# Patient Record
Sex: FEMALE | Race: OTHER
[De-identification: names, ages, dates, MRNs, and addresses within clinical notes are randomized per-mention and may not be internally consistent; named-entity substitution may affect disease eponyms.]

---

## 2018-02-19 ENCOUNTER — HOSPITAL ENCOUNTER (INPATIENT)
Dept: HOSPITAL 11 - JP.SDS | Age: 53
LOS: 3 days | Discharge: HOME | DRG: 336 | End: 2018-02-22
Attending: SURGERY | Admitting: SURGERY
Payer: MEDICARE

## 2018-02-19 DIAGNOSIS — I45.6: ICD-10-CM

## 2018-02-19 DIAGNOSIS — R10.9: ICD-10-CM

## 2018-02-19 DIAGNOSIS — K56.51: Primary | ICD-10-CM

## 2018-02-19 DIAGNOSIS — J45.909: ICD-10-CM

## 2018-02-19 DIAGNOSIS — Z98.84: ICD-10-CM

## 2018-02-19 DIAGNOSIS — Z53.31: ICD-10-CM
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
## 2018-02-19 DIAGNOSIS — E03.9: ICD-10-CM

## 2018-02-19 DIAGNOSIS — K56.1: ICD-10-CM

## 2018-02-19 DIAGNOSIS — Z98.0: ICD-10-CM

## 2018-02-19 DIAGNOSIS — T36.1X5A: ICD-10-CM

## 2018-02-19 DIAGNOSIS — K91.2: ICD-10-CM

## 2018-02-19 DIAGNOSIS — R22.9: ICD-10-CM

## 2018-02-19 DIAGNOSIS — E55.9: ICD-10-CM

## 2018-02-19 DIAGNOSIS — E53.8: ICD-10-CM

## 2018-02-19 PROCEDURE — C9113 INJ PANTOPRAZOLE SODIUM, VIA: HCPCS

## 2018-02-20 PROCEDURE — 3E0T3BZ INTRODUCTION OF ANESTHETIC AGENT INTO PERIPHERAL NERVES AND PLEXI, PERCUTANEOUS APPROACH: ICD-10-PCS | Performed by: SURGERY

## 2018-02-20 PROCEDURE — 0DBL0ZX EXCISION OF TRANSVERSE COLON, OPEN APPROACH, DIAGNOSTIC: ICD-10-PCS | Performed by: SURGERY

## 2018-02-20 PROCEDURE — 0DBA0ZX EXCISION OF JEJUNUM, OPEN APPROACH, DIAGNOSTIC: ICD-10-PCS | Performed by: SURGERY

## 2018-02-20 PROCEDURE — 0DNW0ZZ RELEASE PERITONEUM, OPEN APPROACH: ICD-10-PCS | Performed by: SURGERY

## 2018-02-20 PROCEDURE — 3E0M05Z INTRODUCTION OF ADHESION BARRIER INTO PERITONEAL CAVITY, OPEN APPROACH: ICD-10-PCS | Performed by: SURGERY

## 2018-02-20 RX ADMIN — DIPHENHYDRAMINE HYDROCHLORIDE PRN MG: 50 INJECTION, SOLUTION INTRAMUSCULAR; INTRAVENOUS at 15:12

## 2018-02-20 RX ADMIN — SODIUM CHLORIDE, SODIUM LACTATE, POTASSIUM CHLORIDE, AND CALCIUM CHLORIDE SCH MLS/HR: .6; .31; .03; .02 INJECTION, SOLUTION INTRAVENOUS at 17:01

## 2018-02-20 RX ADMIN — ACETAMINOPHEN SCH: 650 SOLUTION ORAL at 14:44

## 2018-02-20 RX ADMIN — ACETAMINOPHEN SCH MG: 650 SOLUTION ORAL at 18:03

## 2018-02-20 RX ADMIN — SODIUM CHLORIDE SCH UNITS: 0.9 INJECTION, SOLUTION INTRAVENOUS at 20:09

## 2018-02-21 RX ADMIN — DIPHENHYDRAMINE HYDROCHLORIDE PRN MG: 50 INJECTION, SOLUTION INTRAMUSCULAR; INTRAVENOUS at 20:59

## 2018-02-21 RX ADMIN — SODIUM CHLORIDE, SODIUM LACTATE, POTASSIUM CHLORIDE, AND CALCIUM CHLORIDE SCH MLS/HR: .6; .31; .03; .02 INJECTION, SOLUTION INTRAVENOUS at 17:32

## 2018-02-21 RX ADMIN — SODIUM CHLORIDE SCH UNITS: 0.9 INJECTION, SOLUTION INTRAVENOUS at 09:37

## 2018-02-21 RX ADMIN — DIPHENHYDRAMINE HYDROCHLORIDE PRN MG: 50 INJECTION, SOLUTION INTRAMUSCULAR; INTRAVENOUS at 03:19

## 2018-02-21 RX ADMIN — ACETAMINOPHEN SCH MG: 650 SOLUTION ORAL at 05:48

## 2018-02-21 RX ADMIN — SODIUM CHLORIDE SCH UNITS: 0.9 INJECTION, SOLUTION INTRAVENOUS at 20:59

## 2018-02-21 RX ADMIN — ACETAMINOPHEN SCH MG: 650 SOLUTION ORAL at 00:57

## 2018-02-21 RX ADMIN — DIPHENHYDRAMINE HYDROCHLORIDE PRN MG: 50 INJECTION, SOLUTION INTRAMUSCULAR; INTRAVENOUS at 15:39

## 2018-02-21 RX ADMIN — ACETAMINOPHEN SCH MG: 650 SOLUTION ORAL at 12:02

## 2018-02-21 RX ADMIN — ACETAMINOPHEN SCH MG: 650 SOLUTION ORAL at 17:32

## 2018-02-21 RX ADMIN — DIPHENHYDRAMINE HYDROCHLORIDE PRN MG: 50 INJECTION, SOLUTION INTRAMUSCULAR; INTRAVENOUS at 07:30

## 2018-02-21 NOTE — PN
DATE OF SERVICE:  02/21/2018

 

SUBJECTIVE:  Fabiola reports that she developed hives from lidocaine.  It was discontinued.

She states her pain is controlled.  She is using her PCA.  Vital signs have been stable.

She has been up ambulating.  Blood pressure has been running low at 93 to 110/47 to 58.

 

REVIEW OF SYSTEMS:  Remainder of review of systems negative for any pertinent positives and

negatives.

 

OBJECTIVE:  GENERAL:  Fabiola is a pleasant 52-year-old female.

VITAL SIGNS:  TPR is 97.8, 59, 18, and blood pressure 93/58.

HEENT:  Negative.

NECK:  Supple.

HEART:  Regular rate and rhythm.

LUNGS:  Clear.

ABDOMEN:  Dressings dry and intact.  Abdominal binder is on.

EXTREMITIES:  Without peripheral edema.

 

ASSESSMENT:  Open revision of Alysha-en-Y gastric bypass surgery with lysis of adhesions and

partial small bowel obstruction.

 

PLAN:

1. Repeat abdominal flat and upright in 1 hour.

2. Decrease IV to 100 mL per hour.

3. Discontinue telemetry.  Continue pulse ox.

4. Dressing off, may shower.

5. Will advance diet pending Radiology report of upper GI with followup abdominal flat and

    upright.

 

 

 

 

Vicky Tidwell PA-C

DD:  02/21/2018 07:33:51

DT:  02/21/2018 08:38:42

Job #:  [id number]

## 2018-02-21 NOTE — CR
Abdomen 2V AP Flat Upright

 

INDICATION: Status post jejunal jejunal anastomosis revision.

 

FINDINGS: There is a small amount of free air underlying the right hemidiaphragm. There is a drainage
 catheter position in the central abdomen. Contrast from a prior upper GI is demonstrated within the 
distal small bowel. There are no dilated loops of intestine.

 

Impression:

1. Progression of the ingested contrast into the distal small bowel.

## 2018-02-21 NOTE — CR
Limited upper GI

 

The patient has had a prior Alysha-en-Y gastric bypass. The patient is is a recent status post change o
f known jejunal anastomosis revision. There is a drainage catheter in place in the central abdomen. T
here is no extravasation of contrast. 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
stent with a known fistula. The finding is unchanged from a prior study of 10 February 2017. There is
 a small amount of free air underlying the right hemidiaphragm.

 

Impression:

1. Status post jejunal jejunal anastomosis revision. No evidence for comp location.

## 2018-02-22 VITALS — DIASTOLIC BLOOD PRESSURE: 77 MMHG | SYSTOLIC BLOOD PRESSURE: 112 MMHG

## 2018-02-22 RX ADMIN — ACETAMINOPHEN SCH: 650 SOLUTION ORAL at 00:51

## 2018-02-22 RX ADMIN — ACETAMINOPHEN SCH: 650 SOLUTION ORAL at 06:33

## 2018-02-22 RX ADMIN — SODIUM CHLORIDE SCH UNITS: 0.9 INJECTION, SOLUTION INTRAVENOUS at 08:18

## 2018-02-22 NOTE — DISCH
ADMISSION DIAGNOSES:  Partial small bowel obstruction, postprandial abdominal pain, status

post Alysha-en-Y gastric bypass surgery, unspecified surgical malabsorption, B12 deficiency,

and vitamin D deficiency.

 

DISCHARGE DIAGNOSES:  Laparoscopic turned to laparotomy with lysis of adhesions and;

1. Revision of Alysha limb and Alysha-en-Y gastric bypass.

2. Closure of deserosalization of the transverse colon and placement of Interceed mesh for

    extensive intraabdominal adhesions, partial small bowel obstruction at point of

    intussusception of the Alysha limb, deserosalized transverse colon secondary to takedown

    of adhesions.  Date of surgery, 02/20/2018, Zach Reid MD.

 

HISTORY:  Fabiola Liu is a 52-year-old female with postprandial abdominal pain.  After

preoperative evaluation and discussion of possible risks and possible complications, she

wished to proceed with surgical procedure.

 

HOSPITAL COURSE:  Fabiola had her surgery on 02/20/2018.  She had no operative complications.

On postop day #1, her upper GI was normal.  She was started on a step-2 gastric bypass diet.

She did have an allergic reaction with swollen lips, thought to be from the antibiotic

meropenem.  This was stopped, and she was given Solu-Medrol 125 mg IV along with oral

Benadryl.  She had no further problems.  On postop day #2, her oral intake was adequate.

Vital signs were stable.  Pain was well managed.  She was passing flatus and requested to be

discharged to home due to the death of her father-in-law.

 

PHYSICAL EXAMINATION:

GENERAL:  Fabiola is a 52-year-old female.

VITAL SIGNS:  Height is 5 feet 6.14 inches.  Weight is 129 pounds.  TPR 97, 68, 16, and

blood pressure 112/77.

HEENT:  Negative.

NECK:  Supple.

HEART:  Regular rate and rhythm.

LUNGS:  Clear.

ABDOMEN:  Staples in place.  No signs of infection.  She has a midline NEW drain, which has

been draining a light pink serosanguineous drainage and abdominal binder has been on.

EXTREMITIES:  Without peripheral edema.

 

DISPOSITION:  Discharged to home.

 

CONDITION:  Stable and improving.

 

FOLLOWUP APPOINTMENT:  With Vicky Tidwell PA-C, on 03/05/2018 at 10 a.m.

 

DISCHARGE MEDICATIONS:  New Prescriptions:

1. Tylenol 650 mg/20.3 mL cup to take every 6 hours.

2. Dulcolax 10 mg oral twice daily.

3. Doxycycline 100 mg oral q.12 hours, #28.

4. Dilaudid 2 mg 1 to 2 every 4 hours p.r.n. pain, #40.

5. Amitiza 24 mcg b.i.d., #60, 11 refills.

6. Magnesium hydroxide, milk of magnesia, 30 mL to take one daily.  Sent 2 home with the

    patient.

 

She is to resume her home medications of;

1. Alprazolam 0.5 mg oral 3 times a day.

2. Tylenol 650 mg q.6 hours.

3. ProAir 2 puffs inhalation every 4 hours p.r.n. shortness of breath.

4. Aspirin buffered 325 mg oral daily.

5. Vitamin D2 50,000 units oral, takes Tuesdays and Thursdays.

6. Famotidine 20 mg p.o. twice daily.

7. Flovent  mcg one inhalation twice daily.

8. B complex one tablet daily.

9. Lasix 20 mg p.r.n. edema.

10.Glucagon emergency kit 1 mg injection as needed for hypoglycemia.

11.Probiotic and acidophilus one tablet daily.

12.Synthroid 112 mcg oral before breakfast.

13.Liothyronine 5 mcg oral daily.

14.Toprol-XL 12.5 mg oral at bedtime.

15.Nitrostat 0.5 mg sublingual as directed for chest pain.

16.Sennoside 2 tablets oral daily.

17.Norvasc 2.5 oral daily.

18.Benadryl 25 mg take 50 mg p.r.n. allergies.

19.Methadone 10 to 20 mg p.o. t.i.d. p.r.n.

 

DISCHARGE DIET:  Step-2 gastric bypass diet with no cereal.  Drink 8 to 10 glasses of water

a day.

 

ACTIVITY:  No lifting greater than 10 pounds for 6 weeks.  Wear abdominal binder for 6

weeks.  Driving, do not drive while on pain medication.  May shower.

 

DISCHARGE INSTRUCTIONS:  Notify provider if any fever, increased pain, swelling, redness,

drainage, nausea, or vomiting.  Wound incision care, keep site clean and dry.  Strip, empty,

drain, measure and record NEW drain 4 times a day.  Bring record of drainage to clinic

appointment.  Use incentive spirometer 10 times every hour while awake for 1 week.

## 2018-02-26 NOTE — OR
DATE OF PROCEDURE:  02/20/2018

 

PREOPERATIVE DIAGNOSIS:  Partial small bowel obstruction.

 

POSTOPERATIVE DIAGNOSES:

1. Partial small bowel obstruction at point of intussusception, Alysha limb.

2. Area of deserosalization of transverse colon secondary to takedown of adhesions.

3. Extensive intraabdominal adhesions.

 

OPERATIVE PROCEDURES:  Diagnostic laparoscopy converted to laparotomy with lysis of

adhesions and:

1. Revision of Alysha limb component of Alysha-en-Y gastric bypass (44921).

2. Closure of area of deserosalization in transverse colon (52410).

3. Placement of Interceed mesh to displace viscera from pelvic and abdominal walls to

    limit recurrent adhesion formation (57523).

 

ANESTHESIA:  General.

 

ASSISTANT:  Fortunato Riddle MS-3.

 

INDICATION FOR PROCEDURE:  The patient was admitted with a picture of partial small bowel

obstruction following previous gastric bypass.  Plan is to proceed with diagnostic

laparoscopy, laparotomy if necessary with lysis of adhesions and/or resection of the bowel

as indicated.  Potential risks including bleeding, infection, leaks from various GI tract

closures, possible injury to other structures during the course of the procedure, and/or

recurrent bowel obstructions over time were reviewed along with the remote possibility of

cardiopulmonary, septic, or hemorrhagic complications leading to death, and the patient

wishes to proceed.

 

DETAILS OF PROCEDURE:  The patient was taken to the operating room and placed in a supine

position.  After general endotracheal anesthesia was induced, she was converted to a

lithotomy position and a Griffith catheter was inserted.  This was subsequently removed at the

conclusion of the procedure.  The abdomen was prepped and draped.

 

In the left lower quadrant, a transverse incision was made and the peritoneal cavity entered

under direct vision with an Optiview trocar inflated to 15 mmHg pressure with CO2.

Following this, bilateral transversus abdominis plane blocks were then placed across the

lateral and mid abdomen with direct visualization of the needle in the transversus abdominis

muscle plane and injection of the standard solution.  Following this, two additional 5-mm

trocars were initially placed.  After initial dissection began, the patient had quite florid

adhesions and the small bowel was noted to be quite distended proximal to the point of

obstruction, which was adjacent to the Alysha limb, which would make this case further lengthy

__________ was full of GI tract contents, with the laparoscopic approach and also the extent

of adhesions making this a difficult case in terms of adequate visualization and exposure of

the bowel for a resection.  Given this, the trocars were removed and the peritoneal cavity

deflated.  A lower midline incision was then made from roughly 3 fingerbreadths above the

pubis and then extended up to a little bit above the umbilicus.  This was carried down

through the full-thickness of the abdominal wall.  Some additional adhesions were taken down

and freed up to the point where the bowel could be mobilized upward.  The point of

obstruction was in the midportion of the Alysha limb, which was felt to be needing revision

due to intussusception at a point of previous anastomosis in that area.  During the course

of dissection, a small area of deserosalization of the transverse colon was identified, and

this was closed off with a HANK purple load and then reinforced with 3-0 Vicryl seromuscular

stitch allowing us tacking some omentum over the closure site.

 

At this point, the point of the intussusception in the more or less midportion of the Alysha

limb was resected proximally and distally with HANK tan loads and the underlying mesentery

with mesenteric loads and the specimen delivered from the field.  The Alysha limb was then

revised with a side-to-side enteroenterostomy with a single internal firing of the HANK tan

load and the common opening then closed transversely with purple load, angles anastomosed,

and mesenteric defect approximated with some 3-0 Vicryl stitch.  At this point, no further

problems were noted.  The abdomen was irrigated with meropenem-containing saline solution.

To limit recurrent adhesion formation, Interceed mesh was placed in the pelvis in the

midline along the left pelvic sidewalls and up against the abdominal wall.  Following this

then, the midline fascia was approximated with #2 Vicryl stitch and the skin drain with a 10-

[place] round [place] drain through a stab wound just below the incision, and the

incision was closed with staples.  Dressing was applied.  The patient was taken to the

recovery room in a satisfactory condition.  There were no evident complications.

 

 

 

 

Zach Reid MD

DD:  02/25/2018 21:33:32

DT:  02/26/2018 11:24:34

Job #:  [id number]